# Patient Record
(demographics unavailable — no encounter records)

---

## 2025-07-16 NOTE — DISCUSSION/SUMMARY
[de-identified] : WBAT in supportive footwear. Ice to affected area. NSAIDS prn. Gel heel cups prn.  He can do activities as tolerated.  Follow up prn.

## 2025-07-16 NOTE — PHYSICAL EXAM
[Left] : left foot and ankle [Right] : right foot and ankle [NL (40)] : plantar flexion 40 degrees [NL 30)] : inversion 30 degrees [NL (20)] : eversion 20 degrees [5___] : eversion 5[unfilled]/5 [2+] : posterior tibialis pulse: 2+ [Normal] : saphenous nerve sensation normal [] : non-antalgic [Weight -] : weightbearing [Bilateral] : calcaneus bilaterally [FreeTextEntry8] : No tenderness but points to calcaneal apophysis.  [FreeTextEntry9] :  AP, mortise and lateral xrays of the ankle were taken and reviewed today.   [de-identified] : Lateral and axial views of the calcaneus were taken/reviewed today.

## 2025-07-16 NOTE — HISTORY OF PRESENT ILLNESS
[de-identified] : Pt. is a 12 year old male who presents for evaluation of both heels. Denies trauma. Symptoms since early July 2025. Symptoms worse with football. WB in sneakers. He has been using gel heel inserts which have helped. No previous injury/problem with either heel.